# Patient Record
Sex: MALE | Race: WHITE | NOT HISPANIC OR LATINO | URBAN - METROPOLITAN AREA
[De-identification: names, ages, dates, MRNs, and addresses within clinical notes are randomized per-mention and may not be internally consistent; named-entity substitution may affect disease eponyms.]

---

## 2018-01-12 ENCOUNTER — IMPORTED ENCOUNTER (OUTPATIENT)
Dept: URBAN - METROPOLITAN AREA CLINIC 31 | Facility: CLINIC | Age: 83
End: 2018-01-12

## 2018-01-12 PROCEDURE — 92014 COMPRE OPH EXAM EST PT 1/>: CPT

## 2018-01-12 PROCEDURE — 92015 DETERMINE REFRACTIVE STATE: CPT

## 2018-01-12 NOTE — PATIENT DISCUSSION
Annual Good ocular health documented. Discussed options of glasses contacts or refractive surgery. Discussed importance of annual eye exams. PVD discussed in detail any signs of flashes or extreme floaters pt to contact office.

## 2018-01-15 PROBLEM — H43.813: Noted: 2018-01-15

## 2018-01-15 PROBLEM — Z96.1: Noted: 2018-01-15

## 2020-01-07 ENCOUNTER — IMPORTED ENCOUNTER (OUTPATIENT)
Dept: URBAN - METROPOLITAN AREA CLINIC 31 | Facility: CLINIC | Age: 85
End: 2020-01-07

## 2020-01-07 PROBLEM — H26.493: Noted: 2020-01-07

## 2020-01-07 PROBLEM — Z96.1: Noted: 2020-01-07

## 2020-01-07 PROBLEM — H43.813: Noted: 2020-01-07

## 2020-01-07 PROCEDURE — 92014 COMPRE OPH EXAM EST PT 1/>: CPT

## 2020-01-07 PROCEDURE — 92250 FUNDUS PHOTOGRAPHY W/I&R: CPT

## 2022-01-06 ENCOUNTER — IMPORTED ENCOUNTER (OUTPATIENT)
Dept: URBAN - METROPOLITAN AREA CLINIC 31 | Facility: CLINIC | Age: 87
End: 2022-01-06

## 2022-01-06 PROBLEM — T15.12XA: Noted: 2022-01-06

## 2022-01-06 PROBLEM — Z96.1: Noted: 2022-01-06

## 2022-01-06 PROBLEM — H04.123: Noted: 2022-01-06

## 2022-01-06 PROCEDURE — 65205 REMOVE FOREIGN BODY FROM EYE: CPT

## 2022-01-06 NOTE — PATIENT DISCUSSION
1.  A conjunctival foreign body was present OS --The risks benefits and alternatives of foreign body removal were discussed. Antiobiotic drops were applied and the foreign body was successfully removed with moist cotton tip applicator. Use AT gel QID. 2. Pseudophakia OU - IOLs stable. Monitor for changes in vision. 3. Dry Eyes OU:  Start artificials tear gel. Encouraged regular use.  4.  Return for an appointment in 2 months for comprehensive exam with Dr. Naila Peres

## 2022-03-09 ENCOUNTER — IMPORTED ENCOUNTER (OUTPATIENT)
Dept: URBAN - METROPOLITAN AREA CLINIC 31 | Facility: CLINIC | Age: 87
End: 2022-03-09

## 2022-03-09 PROBLEM — Z96.1: Noted: 2022-03-09

## 2022-03-09 PROBLEM — H43.813: Noted: 2022-03-09

## 2022-04-02 ASSESSMENT — TONOMETRY
OS_IOP_MMHG: 13
OD_IOP_MMHG: 15
OS_IOP_MMHG: 15
OD_IOP_MMHG: 12
OS_IOP_MMHG: 12
OD_IOP_MMHG: 16

## 2022-04-02 ASSESSMENT — VISUAL ACUITY
OD_CC: J1+14''
OD_SC: 20/20
OD_SC: 20/30
OS_PH: CC 20/25
OD_SC: 20/25
OD_PH: CC 20/25
OS_SC: 20/25-2
OS_CC: 20/30
OS_CC: J1+14''
OD_CC: 20/30
OS_SC: 20/20
OS_SC: 20/30+2

## 2022-07-30 ENCOUNTER — TELEPHONE ENCOUNTER (OUTPATIENT)
Age: 87
End: 2022-07-30

## 2022-07-30 RX ORDER — BISMUTH SUBSALICYLATE 262 MG/1
1 TABLET, CHEWABLE ORAL
Qty: 0 | Refills: 2 | OUTPATIENT
Start: 2011-11-21 | End: 2011-12-21

## 2022-07-31 ENCOUNTER — TELEPHONE ENCOUNTER (OUTPATIENT)
Age: 87
End: 2022-07-31

## 2022-07-31 RX ORDER — BISMUTH SUBSALICYLATE 262 MG/1
1 TABLET, CHEWABLE ORAL
Qty: 0 | Refills: 2 | Status: ACTIVE | COMMUNITY
Start: 2011-11-21